# Patient Record
Sex: MALE | Race: BLACK OR AFRICAN AMERICAN | NOT HISPANIC OR LATINO | Employment: FULL TIME | URBAN - METROPOLITAN AREA
[De-identification: names, ages, dates, MRNs, and addresses within clinical notes are randomized per-mention and may not be internally consistent; named-entity substitution may affect disease eponyms.]

---

## 2017-02-19 ENCOUNTER — HOSPITAL ENCOUNTER (EMERGENCY)
Facility: HOSPITAL | Age: 22
Discharge: HOME | End: 2017-02-19
Attending: EMERGENCY MEDICINE
Payer: COMMERCIAL

## 2017-02-19 VITALS
BODY MASS INDEX: 21.7 KG/M2 | WEIGHT: 155 LBS | SYSTOLIC BLOOD PRESSURE: 140 MMHG | OXYGEN SATURATION: 96 % | HEIGHT: 71 IN | RESPIRATION RATE: 18 BRPM | TEMPERATURE: 99 F | HEART RATE: 63 BPM | DIASTOLIC BLOOD PRESSURE: 75 MMHG

## 2017-02-19 DIAGNOSIS — L03.011 CELLULITIS OF FINGER OF RIGHT HAND: Primary | ICD-10-CM

## 2017-02-19 PROCEDURE — 96372 THER/PROPH/DIAG INJ SC/IM: CPT

## 2017-02-19 PROCEDURE — 99283 EMERGENCY DEPT VISIT LOW MDM: CPT | Mod: 25

## 2017-02-19 PROCEDURE — 25000003 PHARM REV CODE 250: Performed by: PHYSICIAN ASSISTANT

## 2017-02-19 PROCEDURE — 63600175 PHARM REV CODE 636 W HCPCS: Performed by: PHYSICIAN ASSISTANT

## 2017-02-19 RX ORDER — KETOROLAC TROMETHAMINE 30 MG/ML
10 INJECTION, SOLUTION INTRAMUSCULAR; INTRAVENOUS
Status: COMPLETED | OUTPATIENT
Start: 2017-02-19 | End: 2017-02-19

## 2017-02-19 RX ORDER — SULFAMETHOXAZOLE AND TRIMETHOPRIM 800; 160 MG/1; MG/1
1 TABLET ORAL 2 TIMES DAILY
Qty: 14 TABLET | Refills: 0 | Status: SHIPPED | OUTPATIENT
Start: 2017-02-19 | End: 2017-02-26

## 2017-02-19 RX ORDER — SULFAMETHOXAZOLE AND TRIMETHOPRIM 800; 160 MG/1; MG/1
1 TABLET ORAL
Status: COMPLETED | OUTPATIENT
Start: 2017-02-19 | End: 2017-02-19

## 2017-02-19 RX ORDER — HYDROCODONE BITARTRATE AND ACETAMINOPHEN 5; 325 MG/1; MG/1
1 TABLET ORAL EVERY 6 HOURS PRN
Qty: 12 TABLET | Refills: 0 | Status: SHIPPED | OUTPATIENT
Start: 2017-02-19

## 2017-02-19 RX ADMIN — SULFAMETHOXAZOLE AND TRIMETHOPRIM 1 TABLET: 800; 160 TABLET ORAL at 06:02

## 2017-02-19 RX ADMIN — KETOROLAC TROMETHAMINE 10 MG: 30 INJECTION, SOLUTION INTRAMUSCULAR at 06:02

## 2017-02-19 NOTE — ED AVS SNAPSHOT
[facility]  [place] [address]               Reyes Forrest   2017  6:02 PM   ED    Description:  Male : 1995   Department:  [facility]           Your Care was Coordinated By:     Provider Role From To    Alena Ayala MD Attending Provider 17 --    Saurabh Mejia PA-C Physician Assistant 17 --      Reason for Visit     Hand Pain           Diagnoses this Visit        Comments    Cellulitis of finger of right hand    -  Primary       ED Disposition     None           To Do List           Follow-up Information     Go to [facility].    Specialty:  Emergency Medicine    Why:  If symptoms worsen    Contact information:    [place] [id number] [phone]        Follow up with [facility] orthopedic clinic. Go in 1 day.    Why:  For further evaluation    Contact information:    [facility], [address] [place] [id number]            These Medications        Disp Refills Start End    sulfamethoxazole-trimethoprim 800-160mg (BACTRIM DS) 800-160 mg Tab 14 tablet 0 2017    Take 1 tablet by mouth 2 (two) times daily. - Oral    hydrocodone-acetaminophen 5-325mg (NORCO) 5-325 mg per tablet 12 tablet 0 2017     Take 1 tablet by mouth every 6 (six) hours as needed for Pain. - Oral      [facility]     [facility] On Call Nurse Care Line -  Assistance  Registered nurses in the [facility] On Call Center provide clinical advisement, health education, appointment booking, and other advisory services.  Call for this free service at [phone].             Medications           Message regarding Medications     Verify the changes and/or additions to your medication regime listed below are the same as discussed with your clinician today.  If any of these changes or additions are incorrect, please notify your healthcare provider.       "  START taking these NEW medications        Refills    sulfamethoxazole-trimethoprim 800-160mg (BACTRIM DS) 800-160 mg Tab 0    Sig: Take 1 tablet by mouth 2 (two) times daily.    Class: Print    Route: Oral    hydrocodone-acetaminophen 5-325mg (NORCO) 5-325 mg per tablet 0    Sig: Take 1 tablet by mouth every 6 (six) hours as needed for Pain.    Class: Print    Route: Oral      These medications were administered today        Dose Freq    ketorolac injection 10 mg 10 mg ED 1 Time    Sig: Inject 10 mg into the muscle ED 1 Time.    Class: Normal    Route: Intramuscular    Cosign for Ordering: Required by Alena Ayala MD    sulfamethoxazole-trimethoprim 800-160mg per tablet 1 tablet 1 tablet ED 1 Time    Sig: Take 1 tablet by mouth ED 1 Time.    Class: Normal    Route: Oral    Cosign for Ordering: Required by Alena Ayala MD           Verify that the below list of medications is an accurate representation of the medications you are currently taking.  If none reported, the list may be blank. If incorrect, please contact your healthcare provider. Carry this list with you in case of emergency.           Current Medications     hydrocodone-acetaminophen 5-325mg (NORCO) 5-325 mg per tablet Take 1 tablet by mouth every 4 (four) hours as needed for Pain.    hydrocodone-acetaminophen 5-325mg (NORCO) 5-325 mg per tablet Take 1 tablet by mouth every 6 (six) hours as needed for Pain.    ibuprofen (ADVIL,MOTRIN) 600 MG tablet Take 1 tablet (600 mg total) by mouth every 6 (six) hours as needed for Pain.    sulfamethoxazole-trimethoprim 800-160mg (BACTRIM DS) 800-160 mg Tab Take 1 tablet by mouth 2 (two) times daily.           Clinical Reference Information           Your Vitals Were     BP Pulse Temp Resp Height Weight    164/91 (BP Location: Right arm, Patient Position: Sitting) 66 98.3 °F (36.8 °C) 18 5' 11" (1.803 m) 70.3 kg (155 lb)    SpO2 BMI             98% 21.62 kg/m2         Allergies as of 2/19/2017     No Known " Allergies      Immunizations Administered on Date of Encounter - 2/19/2017     None      ED Micro, Lab, POCT     None      ED Imaging Orders     Start Ordered       Status Ordering Provider    02/19/17 1836 02/19/17 1836  X-Ray Hand 3 view Right  1 time imaging      Final result         Discharge Instructions       Please bring the CD and your discharge papers to the first floor registration desk in the ambulatory care building at [facility].  Upon receipt of your information and CD, you will be scheduled for follow-up in the orthopedic clinic.   Please fill prescriptions and begin taking as directed.    Discharge References/Attachments     CELLULITIS, DISCHARGE INSTRUCTIONS FOR (ENGLISH)      [facility] Sign-Up     Activating your [facility] account is as easy as 1-2-3!     1) Visit [email or web address], select Sign Up Now, enter this activation code and your date of birth, then select Next.  [email or web address]  Expires: 4/5/2017  7:16 PM      2) Create a username and password to use when you visit [facility] in the future and select a security question in case you lose your password and select Next.    3) Enter your e-mail address and click Sign Up!    Additional Information  If you have questions, please e-mail [email or web address] or call [phone] to talk to our [facility] staff. Remember, [facility] is NOT to be used for urgent needs. For medical emergencies, dial 911.         Smoking Cessation     If you would like to quit smoking:   You may be eligible for free services if you are a [place] resident and started smoking cigarettes before September 1, 1988.  Call the Smoking Cessation Trust (SCT) toll free at (934) 845-5467 or (228) 429-5369.   Call 4-648-QUIT-NOW if you do not meet the above criteria.             [facility] complies with applicable Federal civil rights laws and does not discriminate on the basis of race, color, national origin, age, disability, or sex.         Language Assistance Services     ATTENTION: Language assistance services are available, free of charge. Please call [phone].      ATENCIÓN: Si habla luisaañol, tiene a win disposición servicios gratuitos de asistencia lingüística. Llame al [phone].     CHÚ Ý: N?u b?n nói Ti?ng Vi?t, có các d?ch v? h? tr? ngôn ng? mi?n phí dành cho b?n. G?i s? [phone].

## 2017-02-20 NOTE — ED PROVIDER NOTES
Encounter Date: 2/19/2017    SCRIBE #1 NOTE: I, Richie Pineda II, am scribing for, and in the presence of,  Saurabh Mejia PA-C. I have scribed the following portions of the note - Other sections scribed: HPI and ROS.       History     Chief Complaint   Patient presents with    Hand Pain     injured ring finger on right arm due to metal protruding from a metal cable.      Review of patient's allergies indicates:  No Known Allergies  HPI Comments: CC: Hand Pain      HPI: This 21 y.o. male smoker with no medical history presents to the ED c/o acute onset, severe (10/10) hand pain that began at 2 pm today. 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 The wire punctured his ring finger and began to swell. Pt states pain has radiated to his palm. Pt notes pain is exacerbated with extension and palpation. No prior treatment has been attempted. Pt denies LOC, n/v/d, fever, and chills.         SHx: anterior curciate ligament repair    The history is provided by the patient. No  was used.     History reviewed. No pertinent past medical history.  No past medical history pertinent negatives.  Past Surgical History   Procedure Laterality Date    Anterior cruciate ligament repair Right      History reviewed. No pertinent family history.  Social History   Substance Use Topics    Smoking status: Current Every Day Smoker     Packs/day: 0.00     Types: Cigarettes    Smokeless tobacco: None    Alcohol use No      Comment: smokes black and milds     Review of Systems   Constitutional: Negative for chills, diaphoresis and fever.   HENT: Negative for ear pain and sore throat.    Eyes: Negative for pain.        (-) eye problems   Respiratory: Negative for cough and shortness of breath.    Cardiovascular: Negative for chest pain.   Gastrointestinal: Negative for abdominal pain, diarrhea, nausea and vomiting.   Genitourinary: Negative for dysuria.    Musculoskeletal: Positive for arthralgias. Negative for back pain.        (+) Right ring finger pain   Skin: Negative for rash.   Neurological: Negative for headaches.       Physical Exam   Initial Vitals   BP Pulse Resp Temp SpO2   02/19/17 1753 02/19/17 1753 02/19/17 1753 02/19/17 1753 02/19/17 1753   164/91 66 18 98.3 °F (36.8 °C) 98 %     Physical Exam    Vitals reviewed.  Constitutional: He appears well-developed and well-nourished. He is not diaphoretic. No distress.   HENT:   Head: Normocephalic and atraumatic.   Right Ear: External ear normal.   Left Ear: External ear normal.   Nose: Nose normal.   Eyes: Conjunctivae are normal. No scleral icterus.   Neck: Normal range of motion. Neck supple.   Cardiovascular: Normal rate, regular rhythm and intact distal pulses.   Pulmonary/Chest: No respiratory distress.   Musculoskeletal: He exhibits edema and tenderness.        Right hand: Right ring finger: Exhibits decreased ROM, swelling and tenderness. Injuries: puncture wound.   Fourth digit on the right hand has diffuse mild swelling and erythema with a small puncture wound to the lateral side.  He has pain with passive extension.  He is able to flex the finger partially.  Most of his tenderness is near the puncture wound site.  Normal capillary refill time and sensation.   Neurological: He is alert and oriented to person, place, and time. No sensory deficit.   Skin: Skin is warm and dry. No rash and no abscess noted. No erythema.         ED Course   Procedures  Labs Reviewed - No data to display          Medical Decision Making:   History:   Old Medical Records: I decided to obtain old medical records.    Emergency evaluation of a 21-year-old male with no medical history complaining of finger pain and swelling after a yony on a cable wire punctured his finger at work today.  He denies fever.  He presents nontoxic with stable vital signs.  He has mild diffuse erythema and edema to the fourth digit on the right  hand with tenderness.  There is no fluctuance or induration.  X-ray obtained shows no radiopaque foreign body or fracture.  Patient has cellulitis of the fourth digit and will treat with Bactrim and provide first dose in the ED.  I do not suspect flexor tenosynovitis at this time, however, progression to this is possible.  Patient was given strict instructions for orthopedic follow-up and return precautions.  He was treated with Toradol in the ED and discharged with [place] for pain.  Patient verbalized understanding and agreed with plan.  Case discussed with Dr. Ayala.          Scribe Attestation:   Scribe #1: I performed the above scribed service and the documentation accurately describes the services I performed. I attest to the accuracy of the note.    Attending Attestation:           Physician Attestation for Scribe:  Physician Attestation Statement for Scribe #1: I, Saurabh Mejia PA-C, reviewed documentation, as scribed by Richie Pineda II in my presence, and it is both accurate and complete.                 ED Course     Clinical Impression:   The encounter diagnosis was Cellulitis of finger of right hand.          Saurabh Mejia PA-C  02/19/17 1944

## 2017-02-20 NOTE — DISCHARGE INSTRUCTIONS
Please bring the CD and your discharge papers to the first floor registration desk in the ambulatory care building at [facility].  Upon receipt of your information and CD, you will be scheduled for follow-up in the orthopedic clinic.   Please fill prescriptions and begin taking as directed.

## 2017-02-20 NOTE — ED TRIAGE NOTES
Reports grabbing rusted spiked wire with rt. Hand cutting finger 4-5 hrs ago. C/o throbbing pain to finger radiating through hand.

## 2017-06-24 ENCOUNTER — HOSPITAL ENCOUNTER (EMERGENCY)
Facility: HOSPITAL | Age: 22
Discharge: HOME | End: 2017-06-24
Attending: EMERGENCY MEDICINE
Payer: MEDICAID

## 2017-06-24 VITALS
DIASTOLIC BLOOD PRESSURE: 65 MMHG | SYSTOLIC BLOOD PRESSURE: 115 MMHG | OXYGEN SATURATION: 97 % | RESPIRATION RATE: 16 BRPM | HEIGHT: 71 IN | WEIGHT: 170 LBS | HEART RATE: 52 BPM | TEMPERATURE: 98 F | BODY MASS INDEX: 23.8 KG/M2

## 2017-06-24 DIAGNOSIS — R51.9 NONINTRACTABLE HEADACHE, UNSPECIFIED CHRONICITY PATTERN, UNSPECIFIED HEADACHE TYPE: Primary | ICD-10-CM

## 2017-06-24 PROCEDURE — 96375 TX/PRO/DX INJ NEW DRUG ADDON: CPT

## 2017-06-24 PROCEDURE — 99284 EMERGENCY DEPT VISIT MOD MDM: CPT | Mod: 25

## 2017-06-24 PROCEDURE — 96374 THER/PROPH/DIAG INJ IV PUSH: CPT

## 2017-06-24 PROCEDURE — 25000003 PHARM REV CODE 250: Performed by: PHYSICIAN ASSISTANT

## 2017-06-24 PROCEDURE — 63600175 PHARM REV CODE 636 W HCPCS: Performed by: PHYSICIAN ASSISTANT

## 2017-06-24 RX ORDER — PROCHLORPERAZINE EDISYLATE 5 MG/ML
10 INJECTION INTRAMUSCULAR; INTRAVENOUS ONCE
Status: COMPLETED | OUTPATIENT
Start: 2017-06-24 | End: 2017-06-24

## 2017-06-24 RX ORDER — KETOROLAC TROMETHAMINE 30 MG/ML
10 INJECTION, SOLUTION INTRAMUSCULAR; INTRAVENOUS
Status: COMPLETED | OUTPATIENT
Start: 2017-06-24 | End: 2017-06-24

## 2017-06-24 RX ORDER — DIPHENHYDRAMINE HYDROCHLORIDE 50 MG/ML
12.5 INJECTION INTRAMUSCULAR; INTRAVENOUS
Status: COMPLETED | OUTPATIENT
Start: 2017-06-24 | End: 2017-06-24

## 2017-06-24 RX ORDER — BUTALBITAL, ACETAMINOPHEN AND CAFFEINE 50; 325; 40 MG/1; MG/1; MG/1
1 TABLET ORAL EVERY 4 HOURS PRN
Qty: 30 TABLET | Refills: 0 | Status: SHIPPED | OUTPATIENT
Start: 2017-06-24 | End: 2017-07-24

## 2017-06-24 RX ADMIN — PROCHLORPERAZINE EDISYLATE 10 MG: 5 INJECTION INTRAMUSCULAR; INTRAVENOUS at 11:06

## 2017-06-24 RX ADMIN — KETOROLAC TROMETHAMINE 10 MG: 30 INJECTION, SOLUTION INTRAMUSCULAR at 11:06

## 2017-06-24 RX ADMIN — DIPHENHYDRAMINE HYDROCHLORIDE 12.5 MG: 50 INJECTION, SOLUTION INTRAMUSCULAR; INTRAVENOUS at 11:06

## 2017-06-24 RX ADMIN — SODIUM CHLORIDE 1000 ML: 0.9 INJECTION, SOLUTION INTRAVENOUS at 11:06

## 2017-06-24 NOTE — DISCHARGE INSTRUCTIONS
Use prescription as needed for headache relief.  Establish primary care physician.  Return to this ED if any other problems occur.  Continue with increased oral hydration, especially while working.

## 2017-06-24 NOTE — ED PROVIDER NOTES
Encounter Date: 6/24/2017       History     Chief Complaint   Patient presents with    Headache     woke up yesterday with headache getting worse today.  left frontal area throbbing in nature.  denies trauma.  off and on dizziness since yesterday.       21-year-old male with no significant past medical history on file presents the ED with chief complaint headache ×2 days.  Patient admits to left frontal throbbing sensation since yesterday morning.  Patient denies recent head injury or trauma.  Patient denies visual disturbance.  Denies lightheadedness or dizziness.  He denies nausea or vomiting.  He denies constitutional symptoms.  Denies recent illness.  He does admit to weakness.  He denies shortness of breath or chest pain.  He denies neck stiffness or difficulty with neck range of motion.  Symptoms are constant.  No alleviating or exacerbating factors.  No radiation of pain.          Review of patient's allergies indicates:  No Known Allergies  History reviewed. No pertinent past medical history.  Past Surgical History:   Procedure Laterality Date    ANTERIOR CRUCIATE LIGAMENT REPAIR Right      History reviewed. No pertinent family history.  Social History   Substance Use Topics    Smoking status: Current Every Day Smoker     Packs/day: 0.00     Types: Cigarettes    Smokeless tobacco: Current User    Alcohol use Yes      Comment: smokes black and milds, social ETOH     Review of Systems   Constitutional: Negative for activity change, appetite change, chills and fever.   HENT: Negative for congestion, ear discharge, ear pain, facial swelling, nosebleeds, rhinorrhea, sinus pressure, sore throat and trouble swallowing.    Eyes: Negative for photophobia, pain, redness and visual disturbance.   Respiratory: Negative for apnea, cough, chest tightness, shortness of breath, wheezing and stridor.    Cardiovascular: Negative for chest pain and palpitations.   Gastrointestinal: Negative for abdominal pain, nausea and  vomiting.   Endocrine: Negative.    Genitourinary: Negative.    Musculoskeletal: Negative.  Negative for neck pain and neck stiffness.   Skin: Negative.  Negative for color change, rash and wound.   Allergic/Immunologic: Negative.    Neurological: Positive for weakness and headaches. Negative for dizziness, syncope and light-headedness.   Hematological: Negative.    Psychiatric/Behavioral: Negative.    All other systems reviewed and are negative.      Physical Exam     Initial Vitals [06/24/17 1021]   BP Pulse Resp Temp SpO2   132/70 62 16 98 °F (36.7 °C) 99 %      MAP       90.67         Physical Exam    Nursing note and vitals reviewed.  Constitutional: He appears well-developed and well-nourished. He is not diaphoretic. No distress.   HENT:   Head: Normocephalic and atraumatic.   Right Ear: External ear normal.   Left Ear: External ear normal.   Nose: Nose normal.   Mouth/Throat: Oropharynx is clear and moist. No oropharyngeal exudate.   Eyes: Conjunctivae and EOM are normal. Right eye exhibits no discharge. Left eye exhibits no discharge.   Neck: Normal range of motion. Neck supple. No tracheal deviation present.   Cardiovascular: Normal heart sounds.   Pulmonary/Chest: Breath sounds normal. No stridor. No respiratory distress.   Abdominal: Soft. Bowel sounds are normal. He exhibits no distension. There is no tenderness.   Musculoskeletal: Normal range of motion. He exhibits no tenderness.   Lymphadenopathy:     He has no cervical adenopathy.   Neurological: He is alert and oriented to person, place, and time. He has normal strength.   Skin: Skin is warm and dry. Capillary refill takes less than 2 seconds. No rash and no abscess noted. No erythema.   Psychiatric: He has a normal mood and affect. His behavior is normal. Judgment and thought content normal.         ED Course   Procedures  Labs Reviewed - No data to display          Medical Decision Making:   Initial Assessment:   21-year-old male chief complaint  headache ×2 days.  Differential Diagnosis:   Headache disorder , tension type headache, migraine  ED Management:  Patient overall well-appearing, in no acute distress, afebrile, vitals within normal limits.  Patient denies history of headaches or migraines.  He denies recent head injury or trauma.  Patient denies neck stiffness or neck pain.  No evidence of meningismus.  He denies constitutional symptoms.  Physical exam is unremarkable.  Patient is neurologically intact.  PERRL. Full active range of motion of neck without pain.  At this point, I do not suspect insidious process.  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  No medication taken prior to visit.  Symptomatic treatment.  Will reevaluate.    Patient does admit to relief after medicines and fluids.  I will discharge with prescription of Fioricet as needed for headache relief.  I've encouraged increased hydration while at work, as I do suspect this is secondary to dehydration.  Return precautions given.  Other:   I have discussed this case with another health care provider.       <> Summary of the Discussion: I discussed this case with Dr. Galarza.              Attending Attestation:     Physician Attestation Statement for NP/PA:   I discussed this assessment and plan of this patient with the NP/PA, but I did not personally examine the patient. The face to face encounter was performed by the NP/PA.    Other NP/PA Attestation Additions:      Medical Decision Making: Agree with assessment and plan.                 ED Course     Clinical Impression:   The encounter diagnosis was Nonintractable headache, unspecified chronicity pattern, unspecified headache type.    Disposition:   Disposition: Discharged  Condition: Stable  Improved.                        Juan Amin PA-C  06/24/17 1228       Rubén Galarza MD  06/24/17 1240

## 2017-06-24 NOTE — ED TRIAGE NOTES
Headache began yesterday morning, intermittent with dizziness. Never took anything to relieve.  Denies history of migraines.  No other s/s

## 2017-08-01 ENCOUNTER — HOSPITAL ENCOUNTER (EMERGENCY)
Facility: HOSPITAL | Age: 22
Discharge: HOME | End: 2017-08-01
Attending: EMERGENCY MEDICINE
Payer: COMMERCIAL

## 2017-08-01 VITALS
HEIGHT: 71 IN | DIASTOLIC BLOOD PRESSURE: 66 MMHG | RESPIRATION RATE: 18 BRPM | TEMPERATURE: 98 F | WEIGHT: 170 LBS | HEART RATE: 54 BPM | OXYGEN SATURATION: 99 % | BODY MASS INDEX: 23.8 KG/M2 | SYSTOLIC BLOOD PRESSURE: 137 MMHG

## 2017-08-01 DIAGNOSIS — E86.0 DEHYDRATION: Primary | ICD-10-CM

## 2017-08-01 LAB
ALBUMIN SERPL BCP-MCNC: 4 G/DL
ALP SERPL-CCNC: 128 U/L
ALT SERPL W/O P-5'-P-CCNC: 15 U/L
ANION GAP SERPL CALC-SCNC: 6 MMOL/L
AST SERPL-CCNC: 23 U/L
BASOPHILS # BLD AUTO: 0.07 K/UL
BASOPHILS NFR BLD: 1.2 %
BILIRUB SERPL-MCNC: 0.4 MG/DL
BILIRUB UR QL STRIP: NEGATIVE
BUN SERPL-MCNC: 8 MG/DL
CALCIUM SERPL-MCNC: 9.8 MG/DL
CHLORIDE SERPL-SCNC: 107 MMOL/L
CK SERPL-CCNC: 283 U/L
CLARITY UR: CLEAR
CO2 SERPL-SCNC: 26 MMOL/L
COLOR UR: YELLOW
CREAT SERPL-MCNC: 0.9 MG/DL
DIFFERENTIAL METHOD: NORMAL
EOSINOPHIL # BLD AUTO: 0.3 K/UL
EOSINOPHIL NFR BLD: 5.3 %
ERYTHROCYTE [DISTWIDTH] IN BLOOD BY AUTOMATED COUNT: 14.1 %
EST. GFR  (AFRICAN AMERICAN): >60 ML/MIN/1.73 M^2
EST. GFR  (NON AFRICAN AMERICAN): >60 ML/MIN/1.73 M^2
GLUCOSE SERPL-MCNC: 91 MG/DL
GLUCOSE UR QL STRIP: NEGATIVE
HCT VFR BLD AUTO: 44.5 %
HGB BLD-MCNC: 15.2 G/DL
HGB UR QL STRIP: NEGATIVE
KETONES UR QL STRIP: NEGATIVE
LEUKOCYTE ESTERASE UR QL STRIP: NEGATIVE
LYMPHOCYTES # BLD AUTO: 2.1 K/UL
LYMPHOCYTES NFR BLD: 36.1 %
MCH RBC QN AUTO: 28.6 PG
MCHC RBC AUTO-ENTMCNC: 34.2 G/DL
MCV RBC AUTO: 84 FL
MONOCYTES # BLD AUTO: 0.4 K/UL
MONOCYTES NFR BLD: 7 %
NEUTROPHILS # BLD AUTO: 2.9 K/UL
NEUTROPHILS NFR BLD: 50.4 %
NITRITE UR QL STRIP: NEGATIVE
PH UR STRIP: 7 [PH] (ref 5–8)
PLATELET # BLD AUTO: 242 K/UL
PMV BLD AUTO: 10.9 FL
POTASSIUM SERPL-SCNC: 4.1 MMOL/L
PROT SERPL-MCNC: 7.9 G/DL
PROT UR QL STRIP: NEGATIVE
RBC # BLD AUTO: 5.32 M/UL
SODIUM SERPL-SCNC: 139 MMOL/L
SP GR UR STRIP: 1.01 (ref 1–1.03)
URN SPEC COLLECT METH UR: NORMAL
UROBILINOGEN UR STRIP-ACNC: NEGATIVE EU/DL
WBC # BLD AUTO: 5.68 K/UL

## 2017-08-01 PROCEDURE — 80053 COMPREHEN METABOLIC PANEL: CPT

## 2017-08-01 PROCEDURE — 96360 HYDRATION IV INFUSION INIT: CPT

## 2017-08-01 PROCEDURE — 81003 URINALYSIS AUTO W/O SCOPE: CPT

## 2017-08-01 PROCEDURE — 82550 ASSAY OF CK (CPK): CPT

## 2017-08-01 PROCEDURE — 85025 COMPLETE CBC W/AUTO DIFF WBC: CPT

## 2017-08-01 PROCEDURE — 99283 EMERGENCY DEPT VISIT LOW MDM: CPT | Mod: 25

## 2017-08-01 PROCEDURE — 25000003 PHARM REV CODE 250: Performed by: NURSE PRACTITIONER

## 2017-08-01 RX ORDER — ONDANSETRON 4 MG/1
4 TABLET, FILM COATED ORAL EVERY 8 HOURS PRN
Qty: 12 TABLET | Refills: 0 | Status: SHIPPED | OUTPATIENT
Start: 2017-08-01

## 2017-08-01 RX ADMIN — SODIUM CHLORIDE 1000 ML: 0.9 INJECTION, SOLUTION INTRAVENOUS at 04:08

## 2017-08-01 NOTE — ED PROVIDER NOTES
Encounter Date: 8/1/2017       History     Chief Complaint   Patient presents with    Dehydration     cramping, fatigue, dehydration and headache x3 days.  was sent home from work today.     Chief complaint: Dehydration    History of present illness: Patient reports a three-day history of muscle cramping in the upper abdomen, arms, legs, headache.  States the problem is constant.  States that he started drinking water which is had no effect.  States the severity of the proper 7/10.  Associated symptoms include diarrhea and nausea without vomiting.    The history is provided by the patient. No  was used.     Review of patient's allergies indicates:  No Known Allergies  History reviewed. No pertinent past medical history.  Past Surgical History:   Procedure Laterality Date    ANTERIOR CRUCIATE LIGAMENT REPAIR Right      History reviewed. No pertinent family history.  Social History   Substance Use Topics    Smoking status: Current Every Day Smoker     Packs/day: 0.00     Types: Cigarettes    Smokeless tobacco: Current User    Alcohol use Yes      Comment: smokes black and milds, social ETOH     Review of Systems   Constitutional: Negative for appetite change, chills, diaphoresis, fatigue and fever.   HENT: Negative for congestion, ear discharge, ear pain, postnasal drip, rhinorrhea, sinus pressure, sneezing, sore throat and voice change.    Eyes: Negative for discharge, itching and visual disturbance.   Respiratory: Negative for cough, shortness of breath and wheezing.    Cardiovascular: Negative for chest pain, palpitations and leg swelling.   Gastrointestinal: Negative for abdominal pain, nausea and vomiting.   Endocrine: Negative for polydipsia, polyphagia and polyuria.   Genitourinary: Negative for difficulty urinating, discharge, dysuria, frequency, hematuria, penile pain, penile swelling and urgency.   Musculoskeletal: Positive for myalgias. Negative for arthralgias.   Skin: Negative for  rash and wound.   Neurological: Positive for headaches. Negative for dizziness, seizures, syncope and weakness.   Hematological: Negative for adenopathy. Does not bruise/bleed easily.   Psychiatric/Behavioral: Negative for agitation and self-injury. The patient is not nervous/anxious.        Physical Exam     Initial Vitals [08/01/17 1601]   BP Pulse Resp Temp SpO2   131/75 68 16 98.6 °F (37 °C) 100 %      MAP       93.67         Physical Exam    Nursing note and vitals reviewed.  Constitutional: He appears well-developed and well-nourished. He is not diaphoretic. No distress. He is not intubated.   HENT:   Head: Normocephalic and atraumatic.   Right Ear: External ear normal.   Left Ear: External ear normal.   Nose: Nose normal.   Eyes: Pupils are equal, round, and reactive to light. Right eye exhibits no discharge. Left eye exhibits no discharge. No scleral icterus.   Neck: Normal range of motion.   Cardiovascular: Regular rhythm, S1 normal, S2 normal and normal heart sounds. Exam reveals no gallop.    No murmur heard.  Pulmonary/Chest: Effort normal and breath sounds normal. No accessory muscle usage. No apnea, no tachypnea and no bradypnea. He is not intubated. No respiratory distress. He has no decreased breath sounds. He has no wheezes. He has no rhonchi. He has no rales.   Abdominal: Soft. Normal appearance and bowel sounds are normal. He exhibits no distension. There is no tenderness.   Musculoskeletal: Normal range of motion.   Neurological: He is alert and oriented to person, place, and time.   Skin: Skin is dry. Capillary refill takes less than 2 seconds.         ED Course   Procedures  Labs Reviewed   COMPREHENSIVE METABOLIC PANEL - Abnormal; Notable for the following:        Result Value    Anion Gap 6 (*)     All other components within normal limits   CK - Abnormal; Notable for the following:      (*)     All other components within normal limits   CBC W/ AUTO DIFFERENTIAL   URINALYSIS              Medical Decision Making:   Initial Assessment:   21 y.o. male presents for emergent evaluation of muscular skeletal cramps and headache  Differential Diagnosis:   Dehydration, headache, muscle cramping  ED Management:  Following a thorough history and physical, the patient underwent rehydration with IV fluids as well as laboratory testing.  CBC was negative with a white blood cell count of 5.68 and hemoglobin and hematocrit of 15.2 and 44.5 respectively; platelet count was 242.  This indicates no anemia and no systemic infection.  Comp rancid metabolic panel indicated normal renal function with a BUN and creatinine of 8 and 0.9, normal hepatic function with no transaminitis and normal blood electrolyte levels.  CPK was slightly elevated at 283 prior to IV hydration.  Urinalysis  was within normal limits, there was no proteinuria ketonuria hematuria, the urine was negative for nitrites urobilinogen or leukocytes.  PH of the urine was 7.0 and specific gravity was 1.015.  The patient was discharged home in good condition with instructions to continue hydrating with allegedly containing fluids and to use the prescribed Zofran as needed for nausea.  Care of the patient discussed with Dr. yAala who agreed with the assessment and plan.                     ED Course     Clinical Impression:   The encounter diagnosis was Dehydration.    Disposition:   Disposition: Discharged  Condition: Stable                        Cliff Child DNP  08/01/17 5483

## 2017-08-01 NOTE — ED TRIAGE NOTES
Joint/Muscle Pain: Patient complains of muscle pain for which has been present for 3 days. Pain is located in arms and  lower back, is described as tight band, and is intermittent, moderate .  Associated symptoms include: tenderness.  The patient has tried nothing for pain relief.  Related to injury:   no..

## 2018-08-21 ENCOUNTER — HOSPITAL ENCOUNTER (EMERGENCY)
Facility: HOSPITAL | Age: 23
Discharge: HOME | End: 2018-08-21
Attending: EMERGENCY MEDICINE

## 2018-08-21 VITALS
OXYGEN SATURATION: 96 % | HEART RATE: 50 BPM | RESPIRATION RATE: 20 BRPM | BODY MASS INDEX: 27.3 KG/M2 | WEIGHT: 195 LBS | DIASTOLIC BLOOD PRESSURE: 73 MMHG | HEIGHT: 71 IN | SYSTOLIC BLOOD PRESSURE: 139 MMHG | TEMPERATURE: 99 F

## 2018-08-21 DIAGNOSIS — K52.9 GASTROENTERITIS: Primary | ICD-10-CM

## 2018-08-21 DIAGNOSIS — R05.9 COUGH: ICD-10-CM

## 2018-08-21 PROCEDURE — 25000003 PHARM REV CODE 250: Performed by: NURSE PRACTITIONER

## 2018-08-21 PROCEDURE — 96374 THER/PROPH/DIAG INJ IV PUSH: CPT

## 2018-08-21 PROCEDURE — 99283 EMERGENCY DEPT VISIT LOW MDM: CPT | Mod: 25

## 2018-08-21 PROCEDURE — 63600175 PHARM REV CODE 636 W HCPCS: Performed by: NURSE PRACTITIONER

## 2018-08-21 PROCEDURE — 96361 HYDRATE IV INFUSION ADD-ON: CPT

## 2018-08-21 RX ORDER — CETIRIZINE HYDROCHLORIDE 10 MG/1
10 TABLET ORAL DAILY
Qty: 30 TABLET | Refills: 0 | Status: SHIPPED | OUTPATIENT
Start: 2018-08-21 | End: 2019-08-21

## 2018-08-21 RX ORDER — ONDANSETRON 4 MG/1
4 TABLET, ORALLY DISINTEGRATING ORAL EVERY 8 HOURS PRN
Qty: 12 TABLET | Refills: 0 | Status: SHIPPED | OUTPATIENT
Start: 2018-08-21

## 2018-08-21 RX ORDER — ONDANSETRON 2 MG/ML
4 INJECTION INTRAMUSCULAR; INTRAVENOUS
Status: COMPLETED | OUTPATIENT
Start: 2018-08-21 | End: 2018-08-21

## 2018-08-21 RX ADMIN — SODIUM CHLORIDE 1000 ML: 0.9 INJECTION, SOLUTION INTRAVENOUS at 10:08

## 2018-08-21 RX ADMIN — ONDANSETRON 4 MG: 2 INJECTION INTRAMUSCULAR; INTRAVENOUS at 10:08

## 2018-08-21 NOTE — ED PROVIDER NOTES
"Encounter Date: 8/21/2018  SORT: This is a 23 year old male who presents to the ED complaining of cough, rhinorrhea, abdominal pain, diarrhea and nausea.  SCRIBE #1 NOTE: I, Felix Wade, am scribing for, and in the presence of,  Viviana Pagan NP. I have scribed the following portions of the note - Other sections scribed: HPI, ROS.       History     Chief Complaint   Patient presents with    Abdominal Pain     " I think I might have food posioning."  Reports abdominal pain since yesterday. reports multiple episodes of diarrhea. Denies n/v     CC: Multiple Complaints    22 y/o male with no PMHx presents to the ED c/o acute onset HA, nausea, x1 ep of emesis, diarrhea, and productive cough that started yesterday night. The patient reports constant diarrhea yesterday night and x2-3 ep of diarrhea this morning. The patient thinks that he has food poisoning. The patient smokes 1 pack of cigarettes daily and drinks ~3 beers daily but denies illicit drug abuse. The patient denies any sick contacts. The patient denies similar symptoms in the past. The patient denies fever, chills, or rhinorrhea. No attempted treatment reported. No other symptoms reported.      The history is provided by the patient. No  was used.     Review of patient's allergies indicates:  No Known Allergies  History reviewed. No pertinent past medical history.  Past Surgical History:   Procedure Laterality Date    ANTERIOR CRUCIATE LIGAMENT REPAIR Right      History reviewed. No pertinent family history.  Social History     Tobacco Use    Smoking status: Current Every Day Smoker     Packs/day: 0.00     Types: Cigarettes    Smokeless tobacco: Current User   Substance Use Topics    Alcohol use: Yes     Comment: smokes black and milds, social ETOH    Drug use: No     Review of Systems   Constitutional: Negative for chills and fever.   HENT: Negative for congestion, ear pain, rhinorrhea and sore throat.    Eyes: Negative for " redness.   Respiratory: Positive for cough (productive). Negative for shortness of breath.    Cardiovascular: Negative for chest pain.   Gastrointestinal: Positive for diarrhea (x2-3 ep today), nausea and vomiting (x1 ep). Negative for abdominal pain.   Genitourinary: Negative for decreased urine volume, difficulty urinating, dysuria, frequency, hematuria and urgency.   Musculoskeletal: Negative for back pain and neck pain.   Skin: Negative for rash.   Neurological: Positive for headaches.       Physical Exam     Initial Vitals [08/21/18 0904]   BP Pulse Resp Temp SpO2   (!) 152/77 (!) 53 18 98 °F (36.7 °C) 99 %      MAP       --         Physical Exam    Nursing note and vitals reviewed.  Constitutional: He appears well-developed and well-nourished.  Non-toxic appearance. He does not appear ill.   HENT:   Head: Normocephalic.   Mouth/Throat: Oropharynx is clear and moist.   Eyes: Conjunctivae and EOM are normal. Pupils are equal, round, and reactive to light.   Neck: Normal range of motion. Neck supple.   Cardiovascular: Normal rate and regular rhythm.   Pulmonary/Chest: Effort normal and breath sounds normal.   Abdominal: Soft. Normal appearance and bowel sounds are normal. He exhibits no pulsatile midline mass. There is no tenderness. There is no rigidity and no guarding.   Musculoskeletal: Normal range of motion.   Neurological: He is alert and oriented to person, place, and time.   Skin: Skin is warm and dry. Capillary refill takes less than 2 seconds.   Psychiatric: He has a normal mood and affect.         ED Course   Procedures  Labs Reviewed - No data to display       Imaging Results    None          Medical Decision Making:   Differential Diagnosis:   Gastroenteritis, appendicitis, dehydration  ED Management:  Diagnosis management comments: This is an urgent evaluation of a 23-year-old male that presented to the ER with c/o vomiting and diarrhea since last night. Pts exam was as above.     1 L of normal  saline and Zofran given to patient.  He has had no more diarrhea or vomiting while in the emergency department.  Patient lying cells were clear therefore the cough that he is complaining of a bleed most likely is a viral illness or upper respiratory infection.  I have given him instructions on self treatment.  I will discharge patient with Zofran and have encouraged him to continue pushing fluids today.  I have discussed red flags of when to return to emergency department with patient verbalized understanding    Based on exam today - I have low suspicion for medical, surgical or other life threatening condition and I believe pt is safe for discharge and outpatient f/u.    Pt verbalizes understanding of d/c instructions and will return for worsening condition.                Scribe Attestation:   Scribe #1: I performed the above scribed service and the documentation accurately describes the services I performed. I attest to the accuracy of the note.    Attending Attestation:           Physician Attestation for Scribe:  Physician Attestation Statement for Scribe #1: I, Viviana Talamantes - SUNNI, reviewed documentation, as scribed by Felix Wade in my presence, and it is both accurate and complete.                 ED Course as of Aug 21 1259   Tue Aug 21, 2018   0912 BP: (!) 152/77 [CS]      ED Course User Index  [CS] Viviana Talamantes NP     Clinical Impression:   The primary encounter diagnosis was Gastroenteritis. A diagnosis of Cough was also pertinent to this visit.      Disposition:   Disposition: Discharged  Condition: Stable                        Viviana Talamantes NP  08/21/18 5226

## 2018-08-21 NOTE — ED TRIAGE NOTES
Patient came in with c/o abdominal cramping (intermittent), diarrhea and nausea. Patient states that he attended a wedding and started feeling bad towards the end. Symptoms began last night.

## 2021-01-28 ENCOUNTER — HOSPITAL ENCOUNTER (EMERGENCY)
Facility: HOSPITAL | Age: 26
Discharge: HOME | End: 2021-01-28
Attending: EMERGENCY MEDICINE

## 2021-01-28 VITALS
WEIGHT: 220 LBS | BODY MASS INDEX: 30.8 KG/M2 | RESPIRATION RATE: 16 BRPM | SYSTOLIC BLOOD PRESSURE: 140 MMHG | HEIGHT: 71 IN | TEMPERATURE: 99 F | DIASTOLIC BLOOD PRESSURE: 83 MMHG | HEART RATE: 88 BPM | OXYGEN SATURATION: 98 %

## 2021-01-28 DIAGNOSIS — M20.012 MALLET FINGER, LEFT: Primary | ICD-10-CM

## 2021-01-28 PROCEDURE — 29130 APPL FINGER SPLINT STATIC: CPT | Mod: F3

## 2021-01-28 PROCEDURE — 99283 EMERGENCY DEPT VISIT LOW MDM: CPT | Mod: 25

## 2021-08-09 ENCOUNTER — HOSPITAL ENCOUNTER (EMERGENCY)
Facility: HOSPITAL | Age: 26
Discharge: HOME | End: 2021-08-09
Attending: EMERGENCY MEDICINE
Payer: OTHER GOVERNMENT

## 2021-08-09 VITALS
WEIGHT: 226 LBS | BODY MASS INDEX: 31.64 KG/M2 | RESPIRATION RATE: 18 BRPM | SYSTOLIC BLOOD PRESSURE: 140 MMHG | TEMPERATURE: 98 F | HEIGHT: 71 IN | HEART RATE: 66 BPM | OXYGEN SATURATION: 98 % | DIASTOLIC BLOOD PRESSURE: 90 MMHG

## 2021-08-09 DIAGNOSIS — J06.9 VIRAL URI WITH COUGH: Primary | ICD-10-CM

## 2021-08-09 LAB
CTP QC/QA: YES
SARS-COV-2 RDRP RESP QL NAA+PROBE: NEGATIVE

## 2021-08-09 PROCEDURE — U0002 COVID-19 LAB TEST NON-CDC: HCPCS | Performed by: EMERGENCY MEDICINE

## 2021-08-09 PROCEDURE — 99282 EMERGENCY DEPT VISIT SF MDM: CPT | Mod: 25

## 2022-06-06 ENCOUNTER — HOSPITAL ENCOUNTER (EMERGENCY)
Facility: HOSPITAL | Age: 27
Discharge: HOME | End: 2022-06-06
Attending: EMERGENCY MEDICINE

## 2022-06-06 VITALS
RESPIRATION RATE: 16 BRPM | OXYGEN SATURATION: 98 % | SYSTOLIC BLOOD PRESSURE: 133 MMHG | HEART RATE: 62 BPM | TEMPERATURE: 99 F | WEIGHT: 210 LBS | HEIGHT: 71 IN | DIASTOLIC BLOOD PRESSURE: 77 MMHG | BODY MASS INDEX: 29.4 KG/M2

## 2022-06-06 DIAGNOSIS — J06.9 VIRAL URI WITH COUGH: Primary | ICD-10-CM

## 2022-06-06 LAB
CTP QC/QA: YES
SARS-COV-2 RDRP RESP QL NAA+PROBE: NEGATIVE

## 2022-06-06 PROCEDURE — 99282 EMERGENCY DEPT VISIT SF MDM: CPT | Mod: 25

## 2022-06-06 PROCEDURE — U0002 COVID-19 LAB TEST NON-CDC: HCPCS | Performed by: NURSE PRACTITIONER

## 2022-06-06 NOTE — ED TRIAGE NOTES
Pt. Complains of body aches that started 5 days ago, runny nose and congestion that started 3 days ago, and diarrhea that started 2 days ago.

## 2022-06-06 NOTE — ED PROVIDER NOTES
Encounter Date: 6/6/2022       History     Chief Complaint   Patient presents with    Nasal Congestion    Generalized Body Aches     Pt c/o generalized body aches x 5 days accompanied by congestion, intermittent rhinorrhea the last three days and diarrhea x 2 days. Pt denies chest pain, shortness of breath, n/v.  Pt denies taking medications other than andrew-seltzer to alleviate symptoms.     The history is provided by the patient. No  was used.   URI  The primary symptoms include fever (xofr=773.6 resolved), cough and myalgias. Primary symptoms do not include fatigue, ear pain, sore throat, wheezing, abdominal pain, nausea, vomiting, arthralgias or rash. Illness onset: 5d ago. This is a new problem. The problem has not changed since onset.  The myalgias are not associated with weakness.   Symptoms associated with the illness include congestion and rhinorrhea. The illness is not associated with chills or sinus pressure.   tried alkaseltzer plus liquigel tablets.    Review of patient's allergies indicates:  No Known Allergies  History reviewed. No pertinent past medical history.  Past Surgical History:   Procedure Laterality Date    ANTERIOR CRUCIATE LIGAMENT REPAIR Right      History reviewed. No pertinent family history.  Social History     Tobacco Use    Smoking status: Current Every Day Smoker     Packs/day: 0.00     Types: Cigarettes    Smokeless tobacco: Current User   Substance Use Topics    Alcohol use: Yes     Comment: smokes black and milds, social ETOH    Drug use: Yes     Types: Marijuana     Review of Systems   Constitutional: Positive for fever (mgeq=896.6 resolved). Negative for appetite change, chills, diaphoresis and fatigue.   HENT: Positive for congestion, nosebleeds and rhinorrhea. Negative for ear discharge, ear pain, postnasal drip, sinus pressure, sneezing, sore throat and voice change.    Eyes: Negative for discharge, itching and visual disturbance.   Respiratory:  Positive for cough. Negative for shortness of breath and wheezing.    Cardiovascular: Negative for chest pain, palpitations and leg swelling.   Gastrointestinal: Positive for diarrhea. Negative for abdominal pain, nausea and vomiting.   Endocrine: Negative for polydipsia, polyphagia and polyuria.   Genitourinary: Negative for difficulty urinating, dysuria, frequency, hematuria, penile discharge, penile pain, penile swelling and urgency.   Musculoskeletal: Positive for myalgias. Negative for arthralgias.   Skin: Negative for rash and wound.   Neurological: Negative for dizziness, seizures, syncope and weakness.   Hematological: Negative for adenopathy. Does not bruise/bleed easily.   Psychiatric/Behavioral: Negative for agitation and self-injury. The patient is not nervous/anxious.        Physical Exam     Initial Vitals [06/06/22 0823]   BP Pulse Resp Temp SpO2   133/73 70 16 98.2 °F (36.8 °C) 97 %      MAP       --         Physical Exam    Nursing note and vitals reviewed.  Constitutional: He appears well-developed and well-nourished. He is not diaphoretic. No distress. He is not intubated.   HENT:   Head: Normocephalic and atraumatic.   Right Ear: Hearing, tympanic membrane, external ear and ear canal normal.   Left Ear: Hearing, tympanic membrane, external ear and ear canal normal.   Nose: Nose normal. No mucosal edema or rhinorrhea. No epistaxis. Right sinus exhibits no maxillary sinus tenderness and no frontal sinus tenderness. Left sinus exhibits no maxillary sinus tenderness and no frontal sinus tenderness.   Mouth/Throat: Uvula is midline, oropharynx is clear and moist and mucous membranes are normal. No oral lesions. Normal dentition.   Eyes: Pupils are equal, round, and reactive to light. Right eye exhibits no discharge. Left eye exhibits no discharge. No scleral icterus.   Neck:   Normal range of motion.  Cardiovascular: Regular rhythm, S1 normal, S2 normal and normal heart sounds. Exam reveals no gallop.     No murmur heard.  Pulmonary/Chest: Effort normal and breath sounds normal. No accessory muscle usage. No apnea, no tachypnea and no bradypnea. He is not intubated. No respiratory distress. He has no decreased breath sounds. He has no wheezes. He has no rhonchi. He has no rales.   Abdominal: He exhibits no distension.   Musculoskeletal:         General: Normal range of motion.      Cervical back: Normal range of motion.     Neurological: He is alert and oriented to person, place, and time.   Skin: Skin is dry. Capillary refill takes less than 2 seconds.         ED Course   Procedures  Labs Reviewed   SARS-COV-2 RDRP GENE          Imaging Results    None          Medications - No data to display        APC / Resident Notes:   This is an evaluation of a 26 y.o. male that presents to the Emergency Department for URI symptoms. The patient is a non-toxic, afebrile, and well appearing male. On physical exam: Ears: without infection.  Pharynx without infection. Appears well hydrated with moist mucus membranes. Neck soft and supple with no meningeal signs or cervical lymphadenopathy. Breath sounds are clear and equal bilaterally with no adventitious breath sounds, tachypnea or respiratory distress with room air pulse ox of 98% and no evidence of hypoxia.     Vital Signs Are Reassuring. RESULTS: neg for covid    My overall impression is URI. I considered, but at this time, do not suspect OM, OE, strep pharyngitis, meningitis, pneumonia, bacterial sinusitis, or significant dehydration requiring IV fluids or admission.    D/C Meds as prescribed. D/C Information: Tylenol/Ibuprofen PRN, Hydration. The diagnosis, treatment plan, instructions for follow-up and reevaluation with Primary Care as well as ED return precautions were discussed and understanding was verbalized. All questions or concerns have been addressed.         ED Course as of 06/06/22 1437   Mon Jun 06, 2022   0842 BP: 133/73 [VC]   0842 Temp: 98.2 °F (36.8 °C)  [VC]   0842 Pulse: 70 [VC]   0842 Temp src: Oral [VC]   0842 Resp: 16 [VC]   0842 SpO2: 97 % [VC]      ED Course User Index  [VC] Cliff Child DNP             Clinical Impression:   Final diagnoses:  [J06.9] Viral URI with cough (Primary)          ED Disposition Condition    Discharge Stable        ED Prescriptions     None        Follow-up Information     Follow up With Specialties Details Why Contact Info    [facility]  Schedule an appointment as soon as possible for a visit   [address] [place] [address]  [phone]             Cliff Child DNP  06/06/22 4544

## 2022-06-06 NOTE — DISCHARGE INSTRUCTIONS
Covid test is negative.    Use Delsym, over the counter for cough, as directed on package.     Alternate tylenol/ibuprofen every 3h as directed on packages.    Nasal saline drops.  Keep afrin in case of nosebleed that won't stop--if bleeding recurs spray afrin once then hold pressure with chin down.  Return to the Emergency Department for any worsening, change in condition, or any emergent concerns.

## 2022-06-06 NOTE — Clinical Note
"Reyes PUGA" Lon was seen and treated in our emergency department on 6/6/2022.  He may return to work on 06/08/2022.       If you have any questions or concerns, please don't hesitate to call.      oJsee Flood RN    "

## 2023-02-27 ENCOUNTER — HOSPITAL ENCOUNTER (EMERGENCY)
Facility: HOSPITAL | Age: 28
Discharge: HOME | End: 2023-02-27
Attending: EMERGENCY MEDICINE

## 2023-02-27 VITALS
TEMPERATURE: 98 F | WEIGHT: 226 LBS | OXYGEN SATURATION: 96 % | HEIGHT: 72 IN | BODY MASS INDEX: 30.61 KG/M2 | HEART RATE: 61 BPM | DIASTOLIC BLOOD PRESSURE: 78 MMHG | SYSTOLIC BLOOD PRESSURE: 158 MMHG | RESPIRATION RATE: 15 BRPM

## 2023-02-27 DIAGNOSIS — S39.012A STRAIN OF LUMBAR REGION, INITIAL ENCOUNTER: Primary | ICD-10-CM

## 2023-02-27 PROCEDURE — 96372 THER/PROPH/DIAG INJ SC/IM: CPT

## 2023-02-27 PROCEDURE — 25000003 PHARM REV CODE 250

## 2023-02-27 PROCEDURE — 63600175 PHARM REV CODE 636 W HCPCS

## 2023-02-27 PROCEDURE — 99284 EMERGENCY DEPT VISIT MOD MDM: CPT

## 2023-02-27 RX ORDER — IBUPROFEN 600 MG/1
600 TABLET ORAL
Status: COMPLETED | OUTPATIENT
Start: 2023-02-27 | End: 2023-02-27

## 2023-02-27 RX ORDER — CYCLOBENZAPRINE HCL 10 MG
10 TABLET ORAL 3 TIMES DAILY PRN
Qty: 15 TABLET | Refills: 0 | Status: SHIPPED | OUTPATIENT
Start: 2023-02-27

## 2023-02-27 RX ORDER — LIDOCAINE 50 MG/G
1 PATCH TOPICAL
Status: DISCONTINUED | OUTPATIENT
Start: 2023-02-27 | End: 2023-02-27 | Stop reason: HOSPADM

## 2023-02-27 RX ORDER — LIDOCAINE 50 MG/G
1 PATCH TOPICAL DAILY
Qty: 15 PATCH | Refills: 0 | Status: SHIPPED | OUTPATIENT
Start: 2023-02-27

## 2023-02-27 RX ORDER — ACETAMINOPHEN 500 MG
500 TABLET ORAL EVERY 6 HOURS PRN
Qty: 20 TABLET | Refills: 0 | Status: SHIPPED | OUTPATIENT
Start: 2023-02-27

## 2023-02-27 RX ORDER — IBUPROFEN 600 MG/1
600 TABLET ORAL EVERY 6 HOURS PRN
Qty: 20 TABLET | Refills: 0 | Status: SHIPPED | OUTPATIENT
Start: 2023-02-27

## 2023-02-27 RX ORDER — DEXAMETHASONE SODIUM PHOSPHATE 4 MG/ML
8 INJECTION, SOLUTION INTRA-ARTICULAR; INTRALESIONAL; INTRAMUSCULAR; INTRAVENOUS; SOFT TISSUE
Status: COMPLETED | OUTPATIENT
Start: 2023-02-27 | End: 2023-02-27

## 2023-02-27 RX ADMIN — LIDOCAINE 1 PATCH: 50 PATCH TOPICAL at 11:02

## 2023-02-27 RX ADMIN — DEXAMETHASONE SODIUM PHOSPHATE 8 MG: 4 INJECTION INTRA-ARTICULAR; INTRALESIONAL; INTRAMUSCULAR; INTRAVENOUS; SOFT TISSUE at 11:02

## 2023-02-27 RX ADMIN — IBUPROFEN 600 MG: 600 TABLET ORAL at 11:02

## 2023-02-27 NOTE — Clinical Note
"Reyes PUGA "Reyes PUGA" Lon was seen and treated in our emergency department on 2/27/2023.  He may return to work on 03/01/2023.       If you have any questions or concerns, please don't hesitate to call.      Alayna Holdsworth, PA-C"

## 2023-02-27 NOTE — DISCHARGE INSTRUCTIONS
Thank you for coming to our Emergency Department today. It is important to remember that some problems are difficult to diagnose and may not be found during your first visit. Be sure to follow up with your primary care doctor and review any labs/imaging that was performed with them. If you do not have a primary care doctor, you may contact the one listed on your discharge paperwork or you may also call the [facility] Appointment Desk at [phone] to schedule an appointment with one.     All medications may potentially have side effects and it is impossible to predict which medications may give you side effects. If you feel that you are having a negative effect of any medication you should immediately stop taking them and seek medical attention.    Return to the ER with any questions/concerns, new/concerning symptoms, worsening or failure to improve. Do not drive or make any important decisions for 24 hours if you have received any pain medications, sedatives or mood altering drugs during your ER visit.

## 2023-02-27 NOTE — ED PROVIDER NOTES
Encounter Date: 2/27/2023       History     Chief Complaint   Patient presents with    Back Pain     Pt states he was moving furniture last week and injured his lower back. Pt denies taking any medications to relieve symptoms     27-year-old male with no past medical history presents to the ED for left lower back pain.  Patient states it has been going on for 1 week.  Patient complaining achy pain that comes and goes, rates it a 5/10.  He has not tried anything to make this better; except icy hot makes it slightly better.  States movement makes it worse.  Patient states he was moving heavy furniture when the pain started.  Patient denies any other trauma.  Patient denies any IV drug use.  Patient denies fever, sweats, chills, shortness breath, chest pain, abdominal pain, nausea, vomiting, diarrhea, constipation, urinary symptoms, rashes, wounds, numbness, tingling, saddle anesthesia, and weakness.    Review of patient's allergies indicates:  No Known Allergies  History reviewed. No pertinent past medical history.  Past Surgical History:   Procedure Laterality Date    ANTERIOR CRUCIATE LIGAMENT REPAIR Right      History reviewed. No pertinent family history.  Social History     Tobacco Use    Smoking status: Every Day     Packs/day: 0.00     Types: Cigarettes    Smokeless tobacco: Current   Substance Use Topics    Alcohol use: Yes     Comment: smokes black and milds, social ETOH    Drug use: Yes     Types: Marijuana     Review of Systems   Constitutional:  Negative for chills, diaphoresis and fever.   Respiratory:  Negative for shortness of breath.    Cardiovascular:  Negative for chest pain.   Gastrointestinal:  Negative for abdominal pain, constipation, diarrhea, nausea and vomiting.   Genitourinary:  Negative for decreased urine volume, difficulty urinating, dysuria, frequency and urgency.   Musculoskeletal:  Positive for back pain (left lower).   Skin:  Negative for rash and wound.   Neurological:  Negative for  weakness and numbness.        (-) tingling     Physical Exam     Initial Vitals [02/27/23 1021]   BP Pulse Resp Temp SpO2   (!) 158/78 61 15 97.9 °F (36.6 °C) 96 %      MAP       --         Physical Exam    Nursing note and vitals reviewed.  Constitutional: He appears well-developed and well-nourished. He is not diaphoretic. He is active. He does not appear ill. No distress.   HENT:   Head: Normocephalic and atraumatic.   Right Ear: External ear normal.   Left Ear: External ear normal.   Nose: Nose normal.   Eyes: Conjunctivae, EOM and lids are normal. Pupils are equal, round, and reactive to light. Right eye exhibits no discharge. Left eye exhibits no discharge. No scleral icterus.   Neck: Neck supple.   Normal range of motion.   Full passive range of motion without pain.     Cardiovascular:  Normal rate and regular rhythm.           Pulmonary/Chest: Effort normal and breath sounds normal. No respiratory distress.   Abdominal: Abdomen is soft. He exhibits no distension. There is no abdominal tenderness.   Musculoskeletal:         General: Normal range of motion.      Cervical back: Normal, full passive range of motion without pain, normal range of motion and neck supple. No bony tenderness. No spinous process tenderness.      Thoracic back: Normal. No bony tenderness.      Lumbar back: Tenderness (left lower) present. No swelling, deformity or bony tenderness.        Back:      Neurological: He is alert and oriented to person, place, and time. He has normal strength. No sensory deficit. GCS eye subscore is 4. GCS verbal subscore is 5. GCS motor subscore is 6.   Skin: Skin is dry. Capillary refill takes less than 2 seconds.       ED Course   Procedures  Labs Reviewed - No data to display       Imaging Results    None          Medications   LIDOcaine 5 % patch 1 patch (1 patch Transdermal Patch Applied 2/27/23 1104)   dexAMETHasone injection 8 mg (8 mg Intramuscular Given 2/27/23 1103)   ibuprofen tablet 600 mg (600  mg Oral Given 2/27/23 1103)     Medical Decision Making:   Initial Assessment:   27-year-old male with no past medical history presents to the ED for left lower back pain.   Patient's chart and medical history reviewed.  Differential Diagnosis:   Lumbar strain  Lumbar fracture  Herniated disc  Cauda Equina  Spinal Abscess  ED Management:  Patient's vitals reviewed.  He is afebrile, no respiratory distress, nontoxic-appearing in the ED. patient left lower back pain tenderness to palpation.  No midline tenderness.  Patient given a shot of steroids, lidocaine patch, and Motrin for pain. Discussed with patient imaging is not warranted for this injury, due to this likely being a lumbar strain from lifting heavy furniture.  Patient verbalized understanding.  Considered but unlikely cauda equina syndrome due to GEORGIE, no saddle antesthesia, bowel incontinence, urinary retention, or numbness and tingling.  Considered but unlikely a spinal abscess due to no history of IVDU, fevers, fluctuance, neuro deficits, or weakness. Discussed with patient this will take some time to heal and he should rest, ice and heat, and use ibuprofen and Tylenol as needed for pain. Will also send him home with some lidocaine patches, Motrin, Tylenol, and Flexeril. Discussed with him that he must leave patch on for 12 hours then leave off for 12 hours, he verbalized understanding. Patient agrees with this plan. Discussed with him strict return precautions, he verbalized understanding. Patient is stable for discharge.                           Clinical Impression:   Final diagnoses:  [S39.012A] Strain of lumbar region, initial encounter - left sided (Primary)        ED Disposition Condition    Discharge Stable          ED Prescriptions       Medication Sig Dispense Start Date End Date Auth. Provider    ibuprofen (ADVIL,MOTRIN) 600 MG tablet Take 1 tablet (600 mg total) by mouth every 6 (six) hours as needed for Pain. 20 tablet 2/27/2023 -- Kylee  Holdsworth, PA-C    acetaminophen (TYLENOL) 500 MG tablet Take 1 tablet (500 mg total) by mouth every 6 (six) hours as needed for Temperature greater than or Pain. 20 tablet 2/27/2023 -- Alayna Holdsworth, PA-C    LIDOcaine (LIDODERM) 5 % Place 1 patch onto the skin once daily. Remove & Discard patch within 12 hours then leave off for 12 hours 15 patch 2/27/2023 -- Alayna Holdsworth, PA-C    cyclobenzaprine (FLEXERIL) 10 MG tablet Take 1 tablet (10 mg total) by mouth 3 (three) times daily as needed for Muscle spasms. 15 tablet 2/27/2023 -- Alayna Holdsworth, PA-C          Follow-up Information       Follow up With Specialties Details Why Contact Info    [facility] - Emergency Dept Emergency Medicine  If symptoms worsen [id number]  [place] [address] [phone]             Alayna Holdsworth, PA-C  02/27/23 5106